# Patient Record
Sex: FEMALE | Race: WHITE | NOT HISPANIC OR LATINO
[De-identification: names, ages, dates, MRNs, and addresses within clinical notes are randomized per-mention and may not be internally consistent; named-entity substitution may affect disease eponyms.]

---

## 2017-08-07 ENCOUNTER — TRANSCRIPTION ENCOUNTER (OUTPATIENT)
Age: 62
End: 2017-08-07

## 2018-02-06 ENCOUNTER — TRANSCRIPTION ENCOUNTER (OUTPATIENT)
Age: 63
End: 2018-02-06

## 2018-10-29 ENCOUNTER — RECORD ABSTRACTING (OUTPATIENT)
Age: 63
End: 2018-10-29

## 2018-10-29 DIAGNOSIS — Z86.018 PERSONAL HISTORY OF OTHER BENIGN NEOPLASM: ICD-10-CM

## 2018-10-29 DIAGNOSIS — Z80.8 FAMILY HISTORY OF MALIGNANT NEOPLASM OF OTHER ORGANS OR SYSTEMS: ICD-10-CM

## 2018-10-29 DIAGNOSIS — Z87.42 PERSONAL HISTORY OF OTHER DISEASES OF THE FEMALE GENITAL TRACT: ICD-10-CM

## 2018-10-29 DIAGNOSIS — E66.3 OVERWEIGHT: ICD-10-CM

## 2018-10-29 DIAGNOSIS — R92.2 INCONCLUSIVE MAMMOGRAM: ICD-10-CM

## 2018-10-29 DIAGNOSIS — N95.2 POSTMENOPAUSAL ATROPHIC VAGINITIS: ICD-10-CM

## 2018-10-29 DIAGNOSIS — Z80.1 FAMILY HISTORY OF MALIGNANT NEOPLASM OF TRACHEA, BRONCHUS AND LUNG: ICD-10-CM

## 2018-10-29 DIAGNOSIS — Z80.41 FAMILY HISTORY OF MALIGNANT NEOPLASM OF OVARY: ICD-10-CM

## 2018-10-29 LAB — CYTOLOGY CVX/VAG DOC THIN PREP: NORMAL

## 2018-10-29 RX ORDER — ESCITALOPRAM OXALATE 10 MG/1
10 TABLET, FILM COATED ORAL DAILY
Refills: 0 | Status: ACTIVE | COMMUNITY

## 2018-11-19 ENCOUNTER — RESULT REVIEW (OUTPATIENT)
Age: 63
End: 2018-11-19

## 2018-11-30 ENCOUNTER — APPOINTMENT (OUTPATIENT)
Dept: OBGYN | Facility: CLINIC | Age: 63
End: 2018-11-30
Payer: COMMERCIAL

## 2018-11-30 VITALS
BODY MASS INDEX: 30.45 KG/M2 | WEIGHT: 194 LBS | HEIGHT: 67 IN | DIASTOLIC BLOOD PRESSURE: 80 MMHG | SYSTOLIC BLOOD PRESSURE: 120 MMHG

## 2018-11-30 DIAGNOSIS — Z00.00 ENCOUNTER FOR GENERAL ADULT MEDICAL EXAMINATION W/OUT ABNORMAL FINDINGS: ICD-10-CM

## 2018-11-30 DIAGNOSIS — Z87.39 PERSONAL HISTORY OF OTHER DISEASES OF THE MUSCULOSKELETAL SYSTEM AND CONNECTIVE TISSUE: ICD-10-CM

## 2018-11-30 PROCEDURE — 99396 PREV VISIT EST AGE 40-64: CPT

## 2018-12-05 LAB — CYTOLOGY CVX/VAG DOC THIN PREP: NORMAL

## 2018-12-12 ENCOUNTER — RESULT REVIEW (OUTPATIENT)
Age: 63
End: 2018-12-12

## 2018-12-17 PROBLEM — Z87.39 HISTORY OF LOW BACK PAIN: Status: RESOLVED | Noted: 2018-11-30 | Resolved: 2018-12-17

## 2019-11-20 ENCOUNTER — RESULT REVIEW (OUTPATIENT)
Age: 64
End: 2019-11-20

## 2019-11-27 ENCOUNTER — APPOINTMENT (OUTPATIENT)
Dept: OBGYN | Facility: CLINIC | Age: 64
End: 2019-11-27
Payer: COMMERCIAL

## 2019-11-27 ENCOUNTER — TRANSCRIPTION ENCOUNTER (OUTPATIENT)
Age: 64
End: 2019-11-27

## 2019-11-27 VITALS
BODY MASS INDEX: 29.35 KG/M2 | DIASTOLIC BLOOD PRESSURE: 80 MMHG | HEIGHT: 67 IN | SYSTOLIC BLOOD PRESSURE: 110 MMHG | WEIGHT: 187 LBS

## 2019-11-27 DIAGNOSIS — R31.9 URINARY TRACT INFECTION, SITE NOT SPECIFIED: ICD-10-CM

## 2019-11-27 DIAGNOSIS — N39.0 URINARY TRACT INFECTION, SITE NOT SPECIFIED: ICD-10-CM

## 2019-11-27 DIAGNOSIS — Z12.31 ENCOUNTER FOR SCREENING MAMMOGRAM FOR MALIGNANT NEOPLASM OF BREAST: ICD-10-CM

## 2019-11-27 DIAGNOSIS — N95.1 MENOPAUSAL AND FEMALE CLIMACTERIC STATES: ICD-10-CM

## 2019-11-27 PROCEDURE — 81015 MICROSCOPIC EXAM OF URINE: CPT

## 2019-11-27 PROCEDURE — 99396 PREV VISIT EST AGE 40-64: CPT

## 2019-11-27 NOTE — DISCUSSION/SUMMARY
[FreeTextEntry1] : 1. Annual: Benign GYN exam. Counseled new guidelines for cervical cancer screening is repeating pap every 5 yrs if negative co-testing or every 3 yrs if negative cytology. Pt reports anxiety given Fhx of breast and ovarian cancer, desires annual pap and pelvic US. Pap performed today and reassured that majority of cervical cancer due to persistent HPV infection. Pelvic US report reviewed with patient, as well as normal MMG report. Declined STD testing. Up to date on colonoscopy. RTC 1 year for Annual.\par \par 2. Vaginal dryness: Discussed non-hormonal options such as Replens. Rx sent.\par \par 3. Dysuria: moderate blood on UA. Offered tx based on UA and symptoms of UTI. Pt declined and would like to wait first for UCx results.

## 2019-11-27 NOTE — PHYSICAL EXAM
[Awake] : awake [Alert] : alert [Soft] : soft [Oriented x3] : oriented to person, place, and time [Normal] : uterus [Labia Majora] : labia major [Labia Minora] : labia minora [No Bleeding] : there was no active vaginal bleeding [Pap Obtained] : a Pap smear was performed [Uterine Adnexae] : were not tender and not enlarged [Acute Distress] : no acute distress [Mass] : no breast mass [Nipple Discharge] : no nipple discharge [Axillary LAD] : no axillary lymphadenopathy [Tender] : non tender [Discharge] : had no discharge [Motion Tenderness] : there was no cervical motion tenderness [Tenderness] : nontender [Enlarged ___ wks] : not enlarged [Adnexa Tenderness] : were not tender [Ovarian Mass (___ Cm)] : there were no adnexal masses

## 2019-12-04 DIAGNOSIS — R82.998 OTHER ABNORMAL FINDINGS IN URINE: ICD-10-CM

## 2019-12-04 LAB
APPEARANCE: CLEAR
BACTERIA UR CULT: NORMAL
BACTERIA: NEGATIVE
BILIRUBIN URINE: NEGATIVE
BLOOD URINE: ABNORMAL
CALCIUM OXALATE CRYSTALS: ABNORMAL
COLOR: YELLOW
CYTOLOGY CVX/VAG DOC THIN PREP: ABNORMAL
GLUCOSE QUALITATIVE U: NEGATIVE
HPV HIGH+LOW RISK DNA PNL CVX: NOT DETECTED
HYALINE CASTS: 0 /LPF
KETONES URINE: NEGATIVE
LEUKOCYTE ESTERASE URINE: NEGATIVE
MICROSCOPIC-UA: NORMAL
NITRITE URINE: NEGATIVE
PH URINE: 5
PROTEIN URINE: NEGATIVE
RED BLOOD CELLS URINE: 3 /HPF
SPECIFIC GRAVITY URINE: 1.03
SQUAMOUS EPITHELIAL CELLS: 1 /HPF
UROBILINOGEN URINE: NORMAL
WHITE BLOOD CELLS URINE: 1 /HPF

## 2019-12-06 ENCOUNTER — APPOINTMENT (OUTPATIENT)
Dept: OBGYN | Facility: CLINIC | Age: 64
End: 2019-12-06

## 2020-05-01 ENCOUNTER — TRANSCRIPTION ENCOUNTER (OUTPATIENT)
Age: 65
End: 2020-05-01

## 2020-11-04 ENCOUNTER — APPOINTMENT (OUTPATIENT)
Dept: ENDOCRINOLOGY | Facility: CLINIC | Age: 65
End: 2020-11-04
Payer: COMMERCIAL

## 2020-11-04 VITALS
OXYGEN SATURATION: 95 % | BODY MASS INDEX: 28.56 KG/M2 | DIASTOLIC BLOOD PRESSURE: 60 MMHG | HEART RATE: 70 BPM | SYSTOLIC BLOOD PRESSURE: 100 MMHG | WEIGHT: 182 LBS | HEIGHT: 67 IN

## 2020-11-04 PROCEDURE — 99204 OFFICE O/P NEW MOD 45 MIN: CPT

## 2020-11-04 PROCEDURE — 99072 ADDL SUPL MATRL&STAF TM PHE: CPT

## 2020-11-04 NOTE — DATA REVIEWED
[FreeTextEntry1] : TSH checked -yes it was borderline elevated normal free T4 elevated thyroid antibodies, low vitamin D A1c of 5.8%

## 2020-11-04 NOTE — HISTORY OF PRESENT ILLNESS
[FreeTextEntry1] : Ms. NUBIA ZAVALETA is 65 year female  who  presents with concerns of thyroid disorder \par dxed with Hashimoto thyroiditis by Dr. Lee and endocrinologist in [place] few yrs  back\par I have received records from him but they are very difficult to read.\par Her presenting complaints include\par when she bends her head she feels dizzy , \par some blood counts were low \par was on ASA daily stopped it, seeing a cardiologist as well \par has been losing hair approx for  a year. \par nails are brittle \par always been healthy , eats very healthy , harder to lose weight , \par symptoms are worse since Menopause. \par Biotin use-no \par h/o HLD , like EVOO, takes turmeric and tennille\par needs 8-9 hrs of sleep , feels more sleepy now  \par  Family h/o thyroid disorder-yes, sister on medications \par prior h/o thyroid surgery -no \par prior h/o GUTIÉRREZ treatment -no \par prior h/o thyroid medications -no \par Prior h/o radiation exposure to head or neck -no \par h/o exposure to nuclear accident -no \par family h/o thyroid cancer -no \par has anxiety - on Lexapro \par USG performed -no \par TSH checked -yes it was borderline elevated normal free T4 elevated thyroid antibodies, low vitamin D A1c of 5.8%\par \par

## 2020-11-30 ENCOUNTER — RESULT REVIEW (OUTPATIENT)
Age: 65
End: 2020-11-30

## 2020-12-02 ENCOUNTER — APPOINTMENT (OUTPATIENT)
Dept: OBGYN | Facility: CLINIC | Age: 65
End: 2020-12-02
Payer: COMMERCIAL

## 2020-12-02 VITALS
DIASTOLIC BLOOD PRESSURE: 70 MMHG | HEIGHT: 67 IN | WEIGHT: 181 LBS | BODY MASS INDEX: 28.41 KG/M2 | TEMPERATURE: 98 F | SYSTOLIC BLOOD PRESSURE: 122 MMHG

## 2020-12-02 PROCEDURE — 99396 PREV VISIT EST AGE 40-64: CPT

## 2020-12-02 PROCEDURE — 99072 ADDL SUPL MATRL&STAF TM PHE: CPT

## 2020-12-07 LAB
CYTOLOGY CVX/VAG DOC THIN PREP: ABNORMAL
HPV HIGH+LOW RISK DNA PNL CVX: NOT DETECTED

## 2020-12-11 ENCOUNTER — APPOINTMENT (OUTPATIENT)
Dept: ENDOCRINOLOGY | Facility: CLINIC | Age: 65
End: 2020-12-11

## 2020-12-21 PROBLEM — N39.0 URINARY TRACT INFECTION WITH HEMATURIA, SITE UNSPECIFIED: Status: RESOLVED | Noted: 2019-11-27 | Resolved: 2020-12-21

## 2021-07-22 ENCOUNTER — APPOINTMENT (OUTPATIENT)
Dept: PODIATRY | Facility: CLINIC | Age: 66
End: 2021-07-22
Payer: COMMERCIAL

## 2021-07-22 ENCOUNTER — NON-APPOINTMENT (OUTPATIENT)
Age: 66
End: 2021-07-22

## 2021-07-22 VITALS — HEIGHT: 67 IN | BODY MASS INDEX: 28.41 KG/M2 | WEIGHT: 181 LBS

## 2021-07-22 DIAGNOSIS — M20.11 HALLUX VALGUS (ACQUIRED), RIGHT FOOT: ICD-10-CM

## 2021-07-22 DIAGNOSIS — L85.1 ACQUIRED KERATOSIS [KERATODERMA] PALMARIS ET PLANTARIS: ICD-10-CM

## 2021-07-22 DIAGNOSIS — M20.12 HALLUX VALGUS (ACQUIRED), LEFT FOOT: ICD-10-CM

## 2021-07-22 DIAGNOSIS — M89.8X7 OTHER SPECIFIED DISORDERS OF BONE, ANKLE AND FOOT: ICD-10-CM

## 2021-07-22 PROCEDURE — 99203 OFFICE O/P NEW LOW 30 MIN: CPT

## 2021-07-22 PROCEDURE — 99072 ADDL SUPL MATRL&STAF TM PHE: CPT

## 2021-07-22 NOTE — PROCEDURE
[FreeTextEntry1] : had a lengthy discussion with the patient regarding the diagnosis etiology and differential diagnosis as well as treatment options for the presenting problem. Risks alternatives and benefits of treatment ranging from conservative to surgical explained in great detail. I also explained the progression of treatment from conservative to possible surgical treatment options as well as the benefits of each. I do stress conservative treatment if in fact conservative treatment is an option until it no longer provides relief. Over-the-counter products medications padding, and splinting were reviewed as well. All questions asked and answered appropriately to the patient's satisfaction\par \par During the evaluation and management I had a lengthy discussion with the patient regarding benefits of functional foot orthoses. I explained to the patient the etiology and treatment options and one of them included the offloading and balancing of the painful portion of the foot. I explained the importance of balancing in offloading the painful area as part of the overall treatment process to advance healing. I have asked the patient to consider this as part of the treatment\par A complete and thorough evaluation of the type of shoes they should be wearing and type of shoes for this time of year was discussed with patient.\par \par \par Utilizing a scalpel and sterile aseptic technique sharp debridement of multiple hyperkeratotic lesions performed. Appropriate padding were necessary.\par \par follow up prn\par \par \par

## 2021-07-22 NOTE — HISTORY OF PRESENT ILLNESS
[FreeTextEntry1] : Location: HAV/HK both \par Duration: many years, getting owrse\par Chronic:yes\par Past Tx: self\par Exacerbated by: shoes, boots\par \par

## 2021-07-22 NOTE — PHYSICAL EXAM
[General Appearance - In No Acute Distress] : in no acute distress [General Appearance - Alert] : alert [Sensation] : the sensory exam was normal to light touch and pinprick [No Focal Deficits] : no focal deficits [Deep Tendon Reflexes (DTR)] : deep tendon reflexes were 2+ and symmetric [Motor Exam] : the motor exam was normal [Oriented To Time, Place, And Person] : oriented to person, place, and time [Impaired Insight] : insight and judgment were intact [Affect] : the affect was normal [FreeTextEntry3] : Vascular exam reveals palpable pedal pulses, the foot is warm to touch, there was good capillary fill time, the skin is normal in appearance there is no evidence of vascular disease or compromise at this time [de-identified] : HAV bilateral, hypertrophy of PP of GT both, hammer toe 2nd right

## 2021-09-15 DIAGNOSIS — Z12.39 ENCOUNTER FOR OTHER SCREENING FOR MALIGNANT NEOPLASM OF BREAST: ICD-10-CM

## 2021-09-23 ENCOUNTER — TRANSCRIPTION ENCOUNTER (OUTPATIENT)
Age: 66
End: 2021-09-23

## 2021-09-23 DIAGNOSIS — Z86.018 PERSONAL HISTORY OF OTHER BENIGN NEOPLASM: ICD-10-CM

## 2021-10-13 ENCOUNTER — TRANSCRIPTION ENCOUNTER (OUTPATIENT)
Age: 66
End: 2021-10-13

## 2021-12-02 ENCOUNTER — RESULT REVIEW (OUTPATIENT)
Age: 66
End: 2021-12-02

## 2021-12-08 DIAGNOSIS — Z11.3 ENCOUNTER FOR SCREENING FOR INFECTIONS WITH A PREDOMINANTLY SEXUAL MODE OF TRANSMISSION: ICD-10-CM

## 2021-12-08 DIAGNOSIS — Z13.820 ENCOUNTER FOR SCREENING FOR OSTEOPOROSIS: ICD-10-CM

## 2022-01-05 ENCOUNTER — APPOINTMENT (OUTPATIENT)
Dept: OBGYN | Facility: CLINIC | Age: 67
End: 2022-01-05
Payer: COMMERCIAL

## 2022-01-05 VITALS
HEIGHT: 67 IN | DIASTOLIC BLOOD PRESSURE: 70 MMHG | SYSTOLIC BLOOD PRESSURE: 120 MMHG | WEIGHT: 180 LBS | BODY MASS INDEX: 28.25 KG/M2

## 2022-01-05 DIAGNOSIS — Z12.11 ENCOUNTER FOR SCREENING FOR MALIGNANT NEOPLASM OF COLON: ICD-10-CM

## 2022-01-05 DIAGNOSIS — Z13.820 ENCOUNTER FOR SCREENING FOR OSTEOPOROSIS: ICD-10-CM

## 2022-01-05 DIAGNOSIS — N70.11 CHRONIC SALPINGITIS: ICD-10-CM

## 2022-01-05 PROCEDURE — 99072 ADDL SUPL MATRL&STAF TM PHE: CPT

## 2022-01-05 PROCEDURE — 99396 PREV VISIT EST AGE 40-64: CPT

## 2022-01-06 LAB — HPV HIGH+LOW RISK DNA PNL CVX: NOT DETECTED

## 2022-01-10 LAB — CYTOLOGY CVX/VAG DOC THIN PREP: ABNORMAL

## 2022-03-21 ENCOUNTER — APPOINTMENT (OUTPATIENT)
Dept: ENDOCRINOLOGY | Facility: CLINIC | Age: 67
End: 2022-03-21
Payer: COMMERCIAL

## 2022-03-21 VITALS
BODY MASS INDEX: 27.94 KG/M2 | RESPIRATION RATE: 16 BRPM | HEIGHT: 67 IN | DIASTOLIC BLOOD PRESSURE: 70 MMHG | HEART RATE: 88 BPM | OXYGEN SATURATION: 98 % | WEIGHT: 178 LBS | SYSTOLIC BLOOD PRESSURE: 126 MMHG

## 2022-03-21 DIAGNOSIS — R13.10 DYSPHAGIA, UNSPECIFIED: ICD-10-CM

## 2022-03-21 DIAGNOSIS — E04.9 NONTOXIC GOITER, UNSPECIFIED: ICD-10-CM

## 2022-03-21 PROCEDURE — 99214 OFFICE O/P EST MOD 30 MIN: CPT

## 2022-03-21 PROCEDURE — 99072 ADDL SUPL MATRL&STAF TM PHE: CPT

## 2022-03-21 RX ORDER — IBUPROFEN 800 MG/1
800 TABLET, FILM COATED ORAL
Qty: 30 | Refills: 0 | Status: ACTIVE | COMMUNITY
Start: 2022-01-14

## 2022-03-21 RX ORDER — GLYCERIN/MIN OIL/POLYCARBOPHIL
GEL WITH APPLICATOR (GRAM) VAGINAL
Qty: 1 | Refills: 1 | Status: DISCONTINUED | COMMUNITY
Start: 2019-11-27 | End: 2022-03-21

## 2022-03-21 NOTE — HISTORY OF PRESENT ILLNESS
[FreeTextEntry1] : Ms. NUBIA ZAVALETA is 65 year female  who  presents with concerns of thyroid disorder \par dxed with Hashimoto thyroiditis by Dr. Lee and endocrinologist in [place] few yrs  back\par I have received records from him but they are very difficult to read.\par Her presenting complaints include\par when she bends her head she feels dizzy , \par some blood counts were low \par was on ASA daily stopped it, seeing a cardiologist as well \par has been losing hair approx for  a year. \par nails are brittle \par always been healthy , eats very healthy , harder to lose weight , \par symptoms are worse since Menopause. \par Biotin use-no \par h/o HLD , like EVOO, takes turmeric and tennille\par needs 8-9 hrs of sleep , feels more sleepy now  \par  Family h/o thyroid disorder-yes, sister on medications \par prior h/o thyroid surgery -no \par prior h/o GUTIÉRREZ treatment -no \par prior h/o thyroid medications -no \par Prior h/o radiation exposure to head or neck -no \par h/o exposure to nuclear accident -no \par family h/o thyroid cancer -no \par has anxiety - on Lexapro \par USG performed -no \par TSH checked -yes it was borderline elevated normal free T4 elevated thyroid antibodies, low vitamin D A1c of 5.8%\par \par \par 03/21/2022- FOLLOW UP\par She is doing well except for red spots on swallowing dysfunction and neck pressure symptoms.  Was started on low-dose antidepressant her mood is much better on that.  Looking for job opportunities slightly stressed about that as well.  Also has prediabetes.  No other new medical problems.  Has strong family history of multiple cancers which can be a source of anxiety.  On examination small goiter noted.  Will check thyroid function labs.  I will also screen her for prediabetes.  Would like to see her back in follow-up in 1 year from now.\par

## 2022-03-23 LAB
ESTIMATED AVERAGE GLUCOSE: 123 MG/DL
HBA1C MFR BLD HPLC: 5.9 %
T3FREE SERPL-MCNC: 2.74 PG/ML
T4 FREE SERPL-MCNC: 1.1 NG/DL
THYROGLOB AB SERPL-ACNC: <20 IU/ML
THYROPEROXIDASE AB SERPL IA-ACNC: 405 IU/ML
TSH SERPL-ACNC: 2.85 UIU/ML

## 2022-03-24 ENCOUNTER — TRANSCRIPTION ENCOUNTER (OUTPATIENT)
Age: 67
End: 2022-03-24

## 2022-04-05 ENCOUNTER — RESULT REVIEW (OUTPATIENT)
Age: 67
End: 2022-04-05

## 2022-04-20 ENCOUNTER — TRANSCRIPTION ENCOUNTER (OUTPATIENT)
Age: 67
End: 2022-04-20

## 2022-04-22 ENCOUNTER — TRANSCRIPTION ENCOUNTER (OUTPATIENT)
Age: 67
End: 2022-04-22

## 2022-05-16 ENCOUNTER — APPOINTMENT (OUTPATIENT)
Dept: PODIATRY | Facility: CLINIC | Age: 67
End: 2022-05-16
Payer: COMMERCIAL

## 2022-05-16 VITALS — HEIGHT: 67 IN | WEIGHT: 174.31 LBS | BODY MASS INDEX: 27.36 KG/M2

## 2022-05-16 DIAGNOSIS — M79.671 PAIN IN RIGHT FOOT: ICD-10-CM

## 2022-05-16 DIAGNOSIS — L60.0 INGROWING NAIL: ICD-10-CM

## 2022-05-16 PROCEDURE — 99213 OFFICE O/P EST LOW 20 MIN: CPT

## 2022-05-16 NOTE — REVIEW OF SYSTEMS
[As Noted in HPI] : as noted in HPI [Negative] : Musculoskeletal [Leg Claudication] : no intermittent leg claudication [Lower Ext Edema] : no lower extremity edema

## 2022-05-16 NOTE — PROCEDURE
[FreeTextEntry1] : I had a lengthy discussion with the patient regarding the way they should be cutting a nail in an effort to help prevent recurrence of this problem. I also revised self treatment should not be attempted and should there be any redness or pain on the side of the nail rather than treating it themselves they should call the office to make a follow up.\par Using sterile instrumentation a slant back procedure was done to the affected border. The area was then painted with a small amount of betadine and silvadene and a band aid. Discharge instructions were given to the patient and advised bacitracin daily for the next 3-5 days and if not completely better they should contact the office immediately.\par

## 2022-05-16 NOTE — PHYSICAL EXAM
[General Appearance - Alert] : alert [General Appearance - In No Acute Distress] : in no acute distress [FreeTextEntry3] : Vascular exam reveals palpable pedal pulses, the foot is warm to touch, there was good capillary fill time, the skin is normal in appearance there is no evidence of vascular disease or compromise at this time [de-identified] : overall muscle strength testing is normal, ROM to ankle, mid tarsal, MTP joints all WNL and w/out crepitus or jamming. No atrophy and overall weakness noted.\par  [FreeTextEntry1] : Evaluation of the area of chief complaint reveals a mild noninfected but reddened distal portion of the afffected nail. There is no discharge or drainage there is no sign of infectious process signs and symptoms are consistent with the noninfected ingrown nail\par

## 2022-05-16 NOTE — REASON FOR VISIT
[Follow-Up Visit] : a follow-up visit for [Ingrown Nail] : ingrown nail [FreeTextEntry2] : left foot

## 2022-06-15 ENCOUNTER — NON-APPOINTMENT (OUTPATIENT)
Age: 67
End: 2022-06-15

## 2022-06-15 ENCOUNTER — APPOINTMENT (OUTPATIENT)
Dept: NEUROLOGY | Facility: CLINIC | Age: 67
End: 2022-06-15
Payer: MEDICAID

## 2022-06-15 VITALS
HEART RATE: 83 BPM | SYSTOLIC BLOOD PRESSURE: 106 MMHG | BODY MASS INDEX: 27.31 KG/M2 | HEIGHT: 67 IN | WEIGHT: 174 LBS | DIASTOLIC BLOOD PRESSURE: 69 MMHG

## 2022-06-15 DIAGNOSIS — R29.90 UNSPECIFIED SYMPTOMS AND SIGNS INVOLVING THE NERVOUS SYSTEM: ICD-10-CM

## 2022-06-15 PROCEDURE — 99205 OFFICE O/P NEW HI 60 MIN: CPT

## 2022-06-17 NOTE — ASSESSMENT
[FreeTextEntry1] : Meagan Sal is a 66 year old woman with ongoing word finding difficulties, and a transient neurological event described as a sudden numbness in both arms and another episode where she was walking and veered to one side.\par \par MRI Brain to rule out any structural lesions. MRA brain and neck to rule out large artery atherosclerosis considering family history of ASCVD and her own LDL is so high. \par \par Snoring- Home sleep apnea test.\par \par Hyperlipidemia- educated patient and strongly encouraged possible alternatives to statins including Praluent. She has a follow up with her cardiologist scheduled. \par \par Follow up after above complete.

## 2022-06-17 NOTE — HISTORY OF PRESENT ILLNESS
[FreeTextEntry1] : Meagan Sal is a 66 year old woman with a history of familial hyperlipidemia presenting for a consultation. \par \par Medical history: anxiety after losing family, prediabetes, Hashimoto's, hereditary hyperlipidemia (Dr. Nabor Olivares cardiologist)\par Surgical history: 1997 ovarian cyst. \par Family history: breast, stomach, mother- Ovarian cancer\par aunt-pancreatic.\par Father- brain cancer. \par aunt- breast cancer.\par \par Reports word finding difficulties over two years has not been a slow decline. No issue paying bills or performing ADLs. No issues with short term memory. Family does not note any memory difficulties or repeating stories or questions. Snores at night and sleeps 8 hours per night wake up fatigued. Has not been tested for sleep apnea. \par Eats meat once per month.\par Lost weight 10 pounds in 2 months. \par Checked B12 with PCP 6/13\par \par Also reports left arm pain to shoulder and bicep area constant for three weeks described as achiness and does not disturb her sleep.  Also reports, both arms became numb for less than 10 minutes two weeks ago. No weakness. No pain. No neck pain or injury. Denies neck pain radiating down the arm. \par \par She had an episode of walking and veering to left three weeks ago lasted a few minutes. No weakness. No numbness. Has dizziness but not with the episode. No falls. \par \ron Has had dizziness when she bends forward and resolves shortly after she is upright. Described as lightheaded. Denies ringing in the ears. Denies headaches. Denies nausea or vomiting. \par \par Denies leg pain radiating down the leg.\par Does not skip meals. \par Walks but not daily. Active during day.\par \par Current medications:\par Lexapro 10mg\par B12 sublingual (stopped)\par \par No smoking or alcohol use.

## 2022-06-17 NOTE — PHYSICAL EXAM
[FreeTextEntry1] : Physical examination \par General: No acute distress, Awake, Alert.   \par  \par \par Mental status \par Awake, alert, and oriented to person, time and place, Normal attention span and concentration, Recent and remote memory intact, Language intact, Fund of knowledge intact.   \par 6/15/22 MoCA 27/30 memory 5/5\par \par Cranial Nerves \par II: VFF  \par III, IV, VI: PERRL, EOMI.   \par V: Facial sensation is normal B/L.   \par VII: Facial strength is normal B/L. \par \par \par VIII: Gross hearing is intact.    \par \par IX, X: Palate is midline and elevates symmetrically.   \par XI: Trapezius normal strength.   \par XII: Tongue midline without atrophy or fasciculations. \par \par Motor exam  \par Muscle tone - no evidence of rigidity or resistance in all 4 extremities.  \par No atrophy or fasciculations \par Muscle Strength: arms and legs, proximal and distal flexors and extensors are normal \par \par No UE drift.\par \par Reflexes \par  \par \par Diffuse hyporeflexia\par  \par \par Plantars right: mute.   \par Plantars left: mute.   \par  \par Coordination \par Finger to nose: Normal.  \par Heel to shin: Normal.    \par \par Sensory \par Intact sensation to vibration,PP,  and cold.\par  \par \par Gait \par Normal including heels, toes, and tandem gait.  \par  \par

## 2022-06-30 ENCOUNTER — RESULT REVIEW (OUTPATIENT)
Age: 67
End: 2022-06-30

## 2022-07-06 ENCOUNTER — APPOINTMENT (OUTPATIENT)
Dept: NEUROLOGY | Facility: CLINIC | Age: 67
End: 2022-07-06

## 2022-07-06 VITALS
BODY MASS INDEX: 27.31 KG/M2 | OXYGEN SATURATION: 98 % | DIASTOLIC BLOOD PRESSURE: 80 MMHG | HEIGHT: 67 IN | SYSTOLIC BLOOD PRESSURE: 120 MMHG | HEART RATE: 75 BPM | TEMPERATURE: 98.7 F | WEIGHT: 174 LBS

## 2022-07-06 PROCEDURE — 99214 OFFICE O/P EST MOD 30 MIN: CPT

## 2022-07-06 NOTE — HISTORY OF PRESENT ILLNESS
[FreeTextEntry1] : Meagan Sal is a 66 year old woman with a history of familial hyperlipidemia presenting for a follow up appointment. \par  \par She reports she is still discussing adding medication for cholesterol with her cardiologist. \par \par Still having ongoing word finding difficulties. She plans on scheduling the sleep study to evaluate for ROSSY.\par Has not had further episodes of arm numbness. \par \par Had an episode of dizziness since her last appointment. \par Denies headaches. \par \par The remaining neurological review of systems is negative.\par \par 6/15/22\par Meagan Sal is a 66 year old woman with a history of familial hyperlipidemia presenting for a consultation. \par \par Medical history: anxiety after losing family, prediabetes, Hashimoto's, hereditary hyperlipidemia (Dr. Nabor Olivares cardiologist)\par Surgical history: 1997 ovarian cyst. \par Family history: breast, stomach, mother- Ovarian cancer\par aunt-pancreatic.\par Father- brain cancer. \par aunt- breast cancer.\par \par Reports word finding difficulties over two years has not been a slow decline. No issue paying bills or performing ADLs. No issues with short term memory. Family does not note any memory difficulties or repeating stories or questions. Snores at night and sleeps 8 hours per night wake up fatigued. Has not been tested for sleep apnea. \ron Eats meat once per month.\par Lost weight 10 pounds in 2 months. \par Checked B12 with PCP 6/13\par \ron Also reports left arm pain to shoulder and bicep area constant for three weeks described as achiness and does not disturb her sleep.  Also reports, both arms became numb for less than 10 minutes two weeks ago. No weakness. No pain. No neck pain or injury. Denies neck pain radiating down the arm. \par \par She had an episode of walking and veering to left three weeks ago lasted a few minutes. No weakness. No numbness. Has dizziness but not with the episode. No falls. \par \ron Has had dizziness when she bends forward and resolves shortly after she is upright. Described as lightheaded. Denies ringing in the ears. Denies headaches. Denies nausea or vomiting. \par \par Denies leg pain radiating down the leg.\par Does not skip meals. \par Walks but not daily. Active during day.\par \par Current medications:\par Lexapro 10mg\par B12 sublingual (stopped)\par \par No smoking or alcohol use.

## 2022-07-06 NOTE — PHYSICAL EXAM
[FreeTextEntry1] : Physical examination \par General: No acute distress, Awake, Alert.   \par  \par \par Mental status \par Awake, alert, and oriented to person, time and place, Normal attention span and concentration, Recent and remote memory intact, Language intact, Fund of knowledge intact.   \par 6/15/22 MoCA 27/30 memory 5/5\par Delayed recall 3/3\par \par Cranial Nerves \par II: VFF  \par III, IV, VI: PERRL, EOMI.   \par V: Facial sensation is normal B/L.   \par VII: Facial strength is normal B/L. \par \par \par VIII: Gross hearing is intact.    \par \par IX, X: Palate is midline and elevates symmetrically.   \par XI: Trapezius normal strength.   \par XII: Tongue midline without atrophy or fasciculations. \par \par Motor exam  \par Muscle tone - no evidence of rigidity or resistance in all 4 extremities.  \par No atrophy or fasciculations \par Muscle Strength: arms and legs, proximal and distal flexors and extensors are normal \par \par No UE drift.\par \par Reflexes \par  \par \par Diffuse hyporeflexia\par  \par \par Plantars right: mute.   \par Plantars left: mute.   \par  \par Coordination \par Finger to nose: Normal.  \par Heel to shin: Normal.    \par \par Sensory \par Intact sensation to vibration,PP,  and cold.\par  \par \par Gait \par Normal including heels, toes, and tandem gait.  \par  \par

## 2022-07-06 NOTE — ASSESSMENT
[FreeTextEntry1] : Meagan Sal is a 66 year old woman with ongoing word finding difficulties, and a transient neurological event described as a sudden numbness in both arms and another episode where she was walking and veered to one side not likely a TIA.\par \par MRI and MRA reviewed with patient. 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 Discussed with Dr. Dinero. No need for follow up imaging. \par \par Snoring- Home sleep apnea test.\par \par Hyperlipidemia- educated patient and strongly encouraged possible alternatives to statins including Praluent. She has a follow up with her cardiologist scheduled. \par family history of ASCVD and her own LDL is so high. \par \par Follow up in 3-4 months. \par

## 2022-07-06 NOTE — DATA REVIEWED
[de-identified] :  7/5/22\par No acute intracranial hemorrhage, acute infarction, extra-axial fluid collection or hydrocephalus.  [de-identified] : 7/5/22 MRA head and neck\par IMPRESSION:\par \par  No hemodynamically significant stenosis or dissection identified within proximal intracranial and\par neck vasculature.\par \par  1 to 2 mm laterally and posteriorly oriented vascular outpouching arising from the proximal\par cavernous segment of the right internal carotid artery, which may represent small aneurysm,\par

## 2022-07-08 ENCOUNTER — APPOINTMENT (OUTPATIENT)
Dept: GASTROENTEROLOGY | Facility: CLINIC | Age: 67
End: 2022-07-08

## 2022-07-08 VITALS
BODY MASS INDEX: 27.31 KG/M2 | TEMPERATURE: 98.3 F | OXYGEN SATURATION: 96 % | DIASTOLIC BLOOD PRESSURE: 64 MMHG | HEART RATE: 71 BPM | HEIGHT: 67 IN | SYSTOLIC BLOOD PRESSURE: 116 MMHG | WEIGHT: 174 LBS

## 2022-07-08 DIAGNOSIS — Z86.010 PERSONAL HISTORY OF COLONIC POLYPS: ICD-10-CM

## 2022-07-08 PROCEDURE — 99204 OFFICE O/P NEW MOD 45 MIN: CPT

## 2022-07-08 RX ORDER — VIT A AND D3 IN COD LIVER OIL 1250-135
1000 CAPSULE ORAL
Refills: 0 | Status: DISCONTINUED | COMMUNITY
End: 2022-07-08

## 2022-07-08 RX ORDER — VALACYCLOVIR 1 G/1
1 TABLET, FILM COATED ORAL
Qty: 30 | Refills: 0 | Status: ACTIVE | COMMUNITY
Start: 2022-05-19

## 2022-07-08 RX ORDER — CHROMIUM 200 MCG
TABLET ORAL
Refills: 0 | Status: DISCONTINUED | COMMUNITY
End: 2022-07-08

## 2022-07-08 NOTE — ASSESSMENT
[FreeTextEntry1] : History of  colon polyp:  Colonoscopy scheduled\par \par Risks of the procedure including but not limited to bleeding / perforation / infection / anesthesia complication / missed polyp or lesion explained to the  patient . The patient expressed understanding and a desire to proceed with the procedure.\par \par Risk of not doing procedure includes but is not limited to missed or delayed diagnosis of gastrointestinal pathology.\par Pertinent available records reviewed\par

## 2022-07-08 NOTE — HISTORY OF PRESENT ILLNESS
[de-identified] : NUBIA ZAVALETA  is being evaluated at the request of Dr. Sutton for an opinion re: h/o colon polyp.  Denies  nausea, vomiting, fever, chills, diarrhea, constipation, melena, hematemesis, reflux, BRBPR\par \par \par -EGD / colonoscopy  ( 3/2017) for dysphagia  and colon cancer screening  was notable  for gastritis / esophagitis / hemorrhoids / diverticulosis / rectal polyp

## 2022-07-08 NOTE — CONSULT LETTER
[Dear  ___] : Dear  [unfilled], [Consult Letter:] : I had the pleasure of evaluating your patient, [unfilled]. [Please see my note below.] : Please see my note below. [Consult Closing:] : Thank you very much for allowing me to participate in the care of this patient.  If you have any questions, please do not hesitate to contact me. [Sincerely,] : Sincerely, [FreeTextEntry3] : Omid Macedo MD\par tel: [phone]\par fax: [phone]\par

## 2022-07-13 ENCOUNTER — APPOINTMENT (OUTPATIENT)
Dept: NEUROLOGY | Facility: CLINIC | Age: 67
End: 2022-07-13

## 2022-07-13 PROCEDURE — 95806 SLEEP STUDY UNATT&RESP EFFT: CPT

## 2022-07-15 ENCOUNTER — APPOINTMENT (OUTPATIENT)
Dept: NEUROLOGY | Facility: CLINIC | Age: 67
End: 2022-07-15

## 2022-07-17 ENCOUNTER — RESULT REVIEW (OUTPATIENT)
Age: 67
End: 2022-07-17

## 2022-07-18 ENCOUNTER — TRANSCRIPTION ENCOUNTER (OUTPATIENT)
Age: 67
End: 2022-07-18

## 2022-07-20 ENCOUNTER — TRANSCRIPTION ENCOUNTER (OUTPATIENT)
Age: 67
End: 2022-07-20

## 2022-07-20 ENCOUNTER — APPOINTMENT (OUTPATIENT)
Dept: GASTROENTEROLOGY | Facility: HOSPITAL | Age: 67
End: 2022-07-20

## 2022-07-20 DIAGNOSIS — E53.8 DEFICIENCY OF OTHER SPECIFIED B GROUP VITAMINS: ICD-10-CM

## 2022-07-25 ENCOUNTER — TRANSCRIPTION ENCOUNTER (OUTPATIENT)
Age: 67
End: 2022-07-25

## 2022-07-28 ENCOUNTER — APPOINTMENT (OUTPATIENT)
Dept: PULMONOLOGY | Facility: CLINIC | Age: 67
End: 2022-07-28

## 2022-07-28 ENCOUNTER — TRANSCRIPTION ENCOUNTER (OUTPATIENT)
Age: 67
End: 2022-07-28

## 2022-07-28 VITALS
HEIGHT: 67 IN | DIASTOLIC BLOOD PRESSURE: 70 MMHG | WEIGHT: 174 LBS | SYSTOLIC BLOOD PRESSURE: 110 MMHG | BODY MASS INDEX: 27.31 KG/M2 | TEMPERATURE: 98.7 F | OXYGEN SATURATION: 98 % | HEART RATE: 70 BPM

## 2022-07-28 PROCEDURE — 99204 OFFICE O/P NEW MOD 45 MIN: CPT

## 2022-07-28 NOTE — HISTORY OF PRESENT ILLNESS
[FreeTextEntry1] : Evaluation for sleep apnea. [de-identified] : This is a 66-year-old female who is complaining for the past 2 years of waking up tired and word difficulty finding the past 2 years. She sleeps well from 12 midnight to 8 AM. She sleeps through the night. She does snore loudly. She occasionally upon awakening feels like she is holding her breath. There is no witnessed apneas. There is no excessive daytime sleepiness during the day with an [place] score of zero. She has lost 12 pounds recently and is 5 feet 7 weight 174. She had a home sleep study on July 14 which reveals an AHI of 16.1 with a low O2 sat of 84%. She comes in now asking if she should be treated for sleep apnea. Past medical history hyperlipidemia, anxiety disorder. Medications include Lexapro. She is a nonsmoker and nondrinker.

## 2022-07-28 NOTE — ASSESSMENT
[FreeTextEntry1] : Patient with moderate sleep apnea on home study. I have explained to the patient that we generally do treat patients with moderate sleep apnea. I therefore will arrange for autopap. Patient will return to see me 2 months after starting CPAP therapy.

## 2022-07-28 NOTE — PHYSICAL EXAM
[No Acute Distress] : no acute distress [Well Nourished] : well nourished [Well Developed] : well developed [Well-Appearing] : well-appearing [Normal Sclera/Conjunctiva] : normal sclera/conjunctiva [PERRL] : pupils equal round and reactive to light [EOMI] : extraocular movements intact [Normal Outer Ear/Nose] : the outer ears and nose were normal in appearance [No JVD] : no jugular venous distention [No Lymphadenopathy] : no lymphadenopathy [Supple] : supple [Thyroid Normal, No Nodules] : the thyroid was normal and there were no nodules present [No Respiratory Distress] : no respiratory distress  [No Accessory Muscle Use] : no accessory muscle use [Clear to Auscultation] : lungs were clear to auscultation bilaterally [Normal Rate] : normal rate  [Regular Rhythm] : with a regular rhythm [Normal S1, S2] : normal S1 and S2 [No Murmur] : no murmur heard [No Carotid Bruits] : no carotid bruits [No Abdominal Bruit] : a ~M bruit was not heard ~T in the abdomen [No Varicosities] : no varicosities [Pedal Pulses Present] : the pedal pulses are present [No Edema] : there was no peripheral edema [No Palpable Aorta] : no palpable aorta [No Extremity Clubbing/Cyanosis] : no extremity clubbing/cyanosis [Soft] : abdomen soft [Non Tender] : non-tender [Non-distended] : non-distended [No Masses] : no abdominal mass palpated [No HSM] : no HSM [Normal Bowel Sounds] : normal bowel sounds [Normal Posterior Cervical Nodes] : no posterior cervical lymphadenopathy [Normal Anterior Cervical Nodes] : no anterior cervical lymphadenopathy [No Focal Deficits] : no focal deficits [Normal Gait] : normal gait [Normal Affect] : the affect was normal [Normal Insight/Judgement] : insight and judgment were intact [de-identified] : mallampati 2

## 2022-07-29 ENCOUNTER — TRANSCRIPTION ENCOUNTER (OUTPATIENT)
Age: 67
End: 2022-07-29

## 2022-08-01 ENCOUNTER — TRANSCRIPTION ENCOUNTER (OUTPATIENT)
Age: 67
End: 2022-08-01

## 2022-08-12 ENCOUNTER — APPOINTMENT (OUTPATIENT)
Dept: GASTROENTEROLOGY | Facility: CLINIC | Age: 67
End: 2022-08-12

## 2022-09-07 ENCOUNTER — NON-APPOINTMENT (OUTPATIENT)
Age: 67
End: 2022-09-07

## 2022-09-19 DIAGNOSIS — Z15.02 GENETIC SUSCEPTIBILITY TO MALIGNANT NEOPLASM OF OVARY: ICD-10-CM

## 2022-10-05 ENCOUNTER — TRANSCRIPTION ENCOUNTER (OUTPATIENT)
Age: 67
End: 2022-10-05

## 2022-10-11 ENCOUNTER — APPOINTMENT (OUTPATIENT)
Dept: PODIATRY | Facility: CLINIC | Age: 67
End: 2022-10-11

## 2022-10-12 ENCOUNTER — NON-APPOINTMENT (OUTPATIENT)
Age: 67
End: 2022-10-12

## 2022-10-18 ENCOUNTER — APPOINTMENT (OUTPATIENT)
Dept: PULMONOLOGY | Facility: CLINIC | Age: 67
End: 2022-10-18

## 2022-10-21 ENCOUNTER — APPOINTMENT (OUTPATIENT)
Dept: PODIATRY | Facility: CLINIC | Age: 67
End: 2022-10-21

## 2022-10-21 VITALS — BODY MASS INDEX: 27.31 KG/M2 | WEIGHT: 174 LBS | HEIGHT: 67 IN

## 2022-10-21 DIAGNOSIS — M76.61 ACHILLES TENDINITIS, RIGHT LEG: ICD-10-CM

## 2022-10-21 DIAGNOSIS — M67.471 GANGLION, RIGHT ANKLE AND FOOT: ICD-10-CM

## 2022-10-21 PROCEDURE — 73610 X-RAY EXAM OF ANKLE: CPT

## 2022-10-21 PROCEDURE — 73630 X-RAY EXAM OF FOOT: CPT

## 2022-10-21 RX ORDER — ERGOCALCIFEROL 1.25 MG/1
1.25 MG CAPSULE, LIQUID FILLED ORAL
Qty: 4 | Refills: 0 | Status: ACTIVE | COMMUNITY
Start: 2022-05-12

## 2022-10-21 NOTE — PROCEDURE
[FreeTextEntry1] : Based on my physical examination and my clinical findings and the patient's description of the symptoms, a complete differential diagnosis was reviewed with the patient. Possible diagnoses as well as treatment options explained in great detail. All questions asked and answered appropriately.\par After evaluating the patient and examining the area in question and reviewing the x-rays I feel at this time a magnetic resonance imaging is indicated and as a medical necessity and this note will serve as a letter medical necessity. Given the severity of the injury and the mechanism of injury I am concerned about osseous as well as significant soft tissue pathology, injury tear and possible rupture. I feel that an magnetic resonance imaging is the most appropriate diagnostic tests at this time and the patient should be granted authorization for an magnetic resonance imaging\par \par \par X-rays were taken in the office multiple views right foot \par I have reviewed the x-rays taken in the office with the patient, I have discussed my findings my recommendations etiology and treatment options based on x-ray evaluation and interpretation and patient understands, There is no evidence of fracture dislocation\par X-rays were taken in the office multiple views right ankle\par I have reviewed the x-rays taken in the office with the patient, I have discussed my findings my recommendations etiology and treatment options based on x-ray evaluation and interpretation and patient understands, There is no evidence of fracture dislocation\par

## 2022-10-21 NOTE — PHYSICAL EXAM
[FreeTextEntry3] : Vascular exam reveals palpable pedal pulses, the foot is warm to touch, there was good capillary fill time, the skin is normal in appearance there is no evidence of vascular disease or compromise at this time [de-identified] : overall muscle strength testing is normal, ROM to ankle, mid tarsal, MTP joints all WNL and w/out crepitus or jamming. No atrophy and overall weakness noted.\par  [FreeTextEntry1] : mass resembling ganglion/however not normally seen there

## 2022-10-21 NOTE — HISTORY OF PRESENT ILLNESS
[FreeTextEntry1] : Location- posterior aspect right ankle growing off the achilles\par Duration-6 months w/ exacerbations and remissions\par Past tx- seen by PCP iv[facility] derm\par

## 2022-11-28 ENCOUNTER — RESULT REVIEW (OUTPATIENT)
Age: 67
End: 2022-11-28

## 2022-12-07 ENCOUNTER — RESULT REVIEW (OUTPATIENT)
Age: 67
End: 2022-12-07

## 2022-12-09 ENCOUNTER — TRANSCRIPTION ENCOUNTER (OUTPATIENT)
Age: 67
End: 2022-12-09

## 2022-12-13 ENCOUNTER — TRANSCRIPTION ENCOUNTER (OUTPATIENT)
Age: 67
End: 2022-12-13

## 2022-12-14 ENCOUNTER — APPOINTMENT (OUTPATIENT)
Dept: PULMONOLOGY | Facility: CLINIC | Age: 67
End: 2022-12-14

## 2022-12-21 ENCOUNTER — APPOINTMENT (OUTPATIENT)
Dept: NEUROLOGY | Facility: CLINIC | Age: 67
End: 2022-12-21
Payer: MEDICARE

## 2022-12-21 VITALS
HEIGHT: 67 IN | DIASTOLIC BLOOD PRESSURE: 85 MMHG | BODY MASS INDEX: 26.37 KG/M2 | SYSTOLIC BLOOD PRESSURE: 136 MMHG | OXYGEN SATURATION: 99 % | WEIGHT: 168 LBS | HEART RATE: 65 BPM

## 2022-12-21 PROCEDURE — 99215 OFFICE O/P EST HI 40 MIN: CPT

## 2022-12-21 NOTE — HISTORY OF PRESENT ILLNESS
[FreeTextEntry1] : Meagan Sal is a 66 yo right - handed female here for follow-up,  Reports on-going word finding difficulty since 2018 slowly progressing, denies slurred speech, denies headaches.  Diagnosed with anxiety shortly after parents passed away 1 year apart  and currently on lexapro 10mg with no reported side effects.  She completed sleep study which showed mild sleep apnea, not tolerating CPAP at home and currently gets 7-8hr/nightly.  Denies waking up with headaches or dry mouth, however does wake-up feeling tired on a daily basis.  Independent with ADLs, continues to take care of paying bills with no issues and driving.  Previously had episode of feeling as if she was being "pulled" to the left hand side while walking, as of late has had episodes of veering to the right.  Denies fall, denies numbness / tingling. Exercises weekly with walking but not daily, continues to be active during the day.  Eating Mediterranean diet.  Will have lipid panel drawn at upcoming appointment with PCP in February.  \par \par The patient is very concerned about progressive speech difficulty. She describes it as word finding difficulty. She has noted this for over 2-3 years. Initially it would happen occasionally but now feels it happens several times per week. She feels it limits her ability to express herself. She also is not sure why she developed significant anxiety after her parents passed. She never had anxiety or depression issues her whole life. \par \par

## 2022-12-21 NOTE — DATA REVIEWED
[de-identified] :  7/5/22\par No acute intracranial hemorrhage, acute infarction, extra-axial fluid collection or hydrocephalus.  [de-identified] : 7/5/22 MRA head and neck\par IMPRESSION:\par \par  No hemodynamically significant stenosis or dissection identified within proximal intracranial and\par neck vasculature.\par \par  1 to 2 mm laterally and posteriorly oriented vascular outpouching arising from the proximal\par cavernous segment of the right internal carotid artery, which may represent small aneurysm,\par

## 2022-12-21 NOTE — PHYSICAL EXAM
[FreeTextEntry1] : Physical examination \par General: No acute distress, Awake, Alert.   \par  \par \par Mental status \par Awake, alert, and oriented to person, time and place, Normal attention span and concentration, Recent and remote memory intact, Language intact, Fund of knowledge intact.   \par 6/15/22 MoCA 27/30 - 12/21/22 MoCA 27/30  memory 5/5\par Delayed recall 3/3\par \par Cranial Nerves \par II: VFF  \par III, IV, VI: PERRL, EOMI.   \par V: Facial sensation is normal B/L.   \par VII: Facial strength is normal B/L. \par \par \par VIII: Gross hearing is intact.    \par \par IX, X: Palate is midline and elevates symmetrically.   \par XI: Trapezius normal strength.   \par XII: Tongue midline without atrophy or fasciculations. \par \par Motor exam  \par Muscle tone - no evidence of rigidity or resistance in all 4 extremities.  \par No atrophy or fasciculations \par Muscle Strength: arms and legs, proximal and distal flexors and extensors are normal \par \par No UE drift.\par \par Reflexes \par  \par \par Diffuse hyporeflexia\par  \par \par Plantars right: mute.   \par Plantars left: mute.   \par  \par Coordination \par Finger to nose: Normal.  \par Heel to shin: Normal.    \par \par Sensory \par Intact sensation to vibration,PP,  and cold.\par  \par \par Gait \par Normal including heels, toes, and tandem gait.  \par  \par

## 2023-01-18 ENCOUNTER — RESULT REVIEW (OUTPATIENT)
Age: 68
End: 2023-01-18

## 2023-01-18 DIAGNOSIS — R94.02 ABNORMAL BRAIN SCAN: ICD-10-CM

## 2023-01-23 ENCOUNTER — TRANSCRIPTION ENCOUNTER (OUTPATIENT)
Age: 68
End: 2023-01-23

## 2023-01-25 ENCOUNTER — APPOINTMENT (OUTPATIENT)
Dept: NEUROLOGY | Facility: CLINIC | Age: 68
End: 2023-01-25
Payer: MEDICARE

## 2023-01-25 VITALS
SYSTOLIC BLOOD PRESSURE: 114 MMHG | BODY MASS INDEX: 26.84 KG/M2 | WEIGHT: 167 LBS | HEART RATE: 73 BPM | HEIGHT: 66 IN | DIASTOLIC BLOOD PRESSURE: 70 MMHG

## 2023-01-25 DIAGNOSIS — H81.399 OTHER PERIPHERAL VERTIGO, UNSPECIFIED EAR: ICD-10-CM

## 2023-01-25 PROCEDURE — 99215 OFFICE O/P EST HI 40 MIN: CPT

## 2023-01-25 NOTE — ASSESSMENT
[FreeTextEntry1] : Meagan Sal is a 67 year old woman with ongoing word finding difficulties, slowly progressing since 2018.  Currently on lexapro 10mg for anxiety which was diagnosed shortly after losing both parents 1 year apart.   \par \par \par MRI and MRA reviewed with patient. 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 No need for follow up imaging. \par \par Snoring- Home sleep apnea test. showed mild sleep apnea, \par CPAP at home but does not tolerate well and refuses to use it \par \par  \par \par MRI brain shows no structural lesion to explain progressive word finding difficulty and new anxiety at an older age. Her current presentation is atypical for Alzheimers but I question whether this may be a FTD variant . PET scan results reviewed at length with patient which were abnormal but the pattern of hypometabolism suggestive neurodegenrative disorder. Will refer for further Neurocognitive testing  to better assess cogntive function to correlate with the PET Scan results \par \par Refer for Vestibular therapy as well \par

## 2023-01-25 NOTE — HISTORY OF PRESENT ILLNESS
[FreeTextEntry1] : Meagan Sal is a 66 yo right - handed female here for follow-up,  Reports on-going word finding difficulty since 2018 slowly progressing, denies slurred speech, denies headaches.  Diagnosed with anxiety shortly after parents passed away 1 year apart  and currently on lexapro 10mg with no reported side effects.  She completed sleep study which showed mild sleep apnea, not tolerating CPAP at home and currently gets 7-8hr/nightly.  Denies waking up with headaches or dry mouth, however does wake-up feeling tired on a daily basis.  Independent with ADLs, continues to take care of paying bills with no issues and driving.  .  \par \par The patient is very concerned about progressive speech difficulty. She describes it as word finding difficulty. She has noted this for over 2-3 years. Initially it would happen occasionally but now feels it happens several times per week. She feels it limits her ability to express herself. She also is not sure why she developed significant anxiety after her parents passed. She never had anxiety or depression issues her whole life. \par \par Now here to follow up on PET scan results \par \par Previously had episode of feeling as if she was being "pulled" to the left hand side while walking, as of late has had episodes of veering to the right.  Denies fall, denies numbness / tingling. Exercises weekly with walking but not daily, continues to be active during the day.  Eating Mediterranean diet.  \par \par

## 2023-01-25 NOTE — DATA REVIEWED
[de-identified] :  7/5/22\par No acute intracranial hemorrhage, acute infarction, extra-axial fluid collection or hydrocephalus.  [de-identified] : 7/5/22 MRA head and neck\par IMPRESSION:\par \par  No hemodynamically significant stenosis or dissection identified within proximal intracranial and\par neck vasculature.\par \par  1 to 2 mm laterally and posteriorly oriented vascular outpouching arising from the proximal\par cavernous segment of the right internal carotid artery, which may represent small aneurysm,\par \par \par 1/18/23 PET scan - see detailed report : abnormal hypometabolism pronounced sensorimotor cortex extending to prefrontal cortex, SMA, and superior parietal region with subtle asymmetry in left BG, thalamus. These findings are consistent with neurodegenerative disorder

## 2023-01-25 NOTE — REVIEW OF SYSTEMS
[Difficulty with Language] : ~M difficulty with language [Dizziness] : dizziness [Vertigo] : vertigo [Anxiety] : anxiety [Depression] : depression [Fever] : no fever [Chills] : no chills [Feeling Poorly] : not feeling poorly [Feeling Tired] : not feeling tired [Confused or Disoriented] : no confusion [Memory Lapses or Loss] : no memory loss [Decr. Concentrating Ability] : no decrease in concentrating ability [Changed Thought Patterns] : no change in thought patterns [Repeating Questions] : no repeated questioning about recent events [Facial Weakness] : no facial weakness [Arm Weakness] : no arm weakness [Hand Weakness] : no hand weakness [Leg Weakness] : no leg weakness [Poor Coordination] : good coordination [Difficulty Writing] : no difficulty writing [Difficulties in Speech] : no speech difficulties [Numbness] : no numbness [Tingling] : no tingling [Abnormal Sensation] : no abnormal sensation [Hypersensitivity] : no hypersensitivity [Fainting] : no fainting [Lightheadedness] : no lightheadedness [Cluster Headache] : no cluster headache [Migraine Headache] : no migraine headache [Tension Headache] : no tension-type headache [Difficulty Walking] : no difficulty walking [Inability to Walk] : able to walk [Ataxia] : no ataxia [Frequent Falls] : not falling [Limping] : not limping [Suicidal] : not suicidal [Change In Personality] : no personality change [Emotional Problems] : no emotional problems [Eyesight Problems] : no eyesight problems [Chest Pain] : no chest pain [Palpitations] : no palpitations [Shortness Of Breath] : no shortness of breath [Cough] : no cough [Muscle Weakness] : no muscle weakness

## 2023-01-31 ENCOUNTER — TRANSCRIPTION ENCOUNTER (OUTPATIENT)
Age: 68
End: 2023-01-31

## 2023-02-21 ENCOUNTER — FORM ENCOUNTER (OUTPATIENT)
Age: 68
End: 2023-02-21

## 2023-03-07 ENCOUNTER — APPOINTMENT (OUTPATIENT)
Dept: NEUROLOGY | Facility: CLINIC | Age: 68
End: 2023-03-07
Payer: MEDICARE

## 2023-03-07 VITALS
BODY MASS INDEX: 26.84 KG/M2 | WEIGHT: 167 LBS | DIASTOLIC BLOOD PRESSURE: 82 MMHG | SYSTOLIC BLOOD PRESSURE: 125 MMHG | HEIGHT: 66 IN | HEART RATE: 65 BPM

## 2023-03-07 PROCEDURE — 99213 OFFICE O/P EST LOW 20 MIN: CPT

## 2023-03-08 NOTE — DATA REVIEWED
[de-identified] :  7/5/22\par No acute intracranial hemorrhage, acute infarction, extra-axial fluid collection or hydrocephalus.  [de-identified] : 2/27/23: diagnostic impression- examination shows a generally normal neurocognitive profile with several low average scores that are mildly below expectation for this patient, affecting semantic memory access ( visual confrontation, naming category verbal fluency), verbal abstract thinking, and working memory.  The findings, however do not meet criteria for a diagnosis of mild neurocognitive disorder.   [de-identified] : 7/5/22 MRA head and neck\par IMPRESSION:\par \par  No hemodynamically significant stenosis or dissection identified within proximal intracranial and\par neck vasculature.\par \par  1 to 2 mm laterally and posteriorly oriented vascular outpouching arising from the proximal\par cavernous segment of the right internal carotid artery, which may represent small aneurysm,\par \par \par 1/18/23 PET scan - see detailed report : abnormal hypometabolism pronounced sensorimotor cortex extending to prefrontal cortex, SMA, and superior parietal region with subtle asymmetry in left BG, thalamus. These findings are consistent with neurodegenerative disorder

## 2023-03-08 NOTE — HISTORY OF PRESENT ILLNESS
[FreeTextEntry1] : Meagan Sal is a 68 yo right - handed female here for follow-up,  Reports on-going word finding difficulty since 2018 slowly progressing, denies slurred speech, denies headaches.  Diagnosed with anxiety shortly after parents passed away 1 year apart  and currently on lexapro 10mg with no reported side effects.  She completed sleep study which showed mild sleep apnea, not tolerating CPAP at home and currently gets 7-8hr/nightly.  Denies waking up with headaches or dry mouth, however does wake-up feeling tired on a daily basis.  Independent with ADLs, continues to take care of paying bills with no issues and driving.  .  \par \par Meagan continues to be very concerned about speech difficulty, reports has not seen progression since last visit.  Neuropsychological testing completed which showed no sign of neurocognitive disorder diagnosed with word finding difficulty.  Previous visit MD Dinero reviewed PET scan results with Meagan, she is very concerned regarding abnormalities stated on report.  Continues to be socially, physically, and mentally active.  Exercises weekly with walking but not daily, continues to be active during the day.  Eating Mediterranean diet.  \par \par

## 2023-03-08 NOTE — ASSESSMENT
[FreeTextEntry1] : Meagan Sal is a 67 year old woman with ongoing word finding difficulties, slowly progressing since 2018.  Currently on lexapro 10mg for anxiety which was diagnosed shortly after losing both parents 1 year apart.   \par \par Previous visit MD Dinero spoke to Meagan in regards to PET scan results which had abnormal findings, pattern of hypometabolism suggestive neurodegenerative disorder.  Neurocognitive testing complete which showed no evidence of neurocognitive disorder diagnosed with word finding difficulty.\par Explained in length to Meagan neuropsychological testing results and recommendation would be to monitor at this time. Encouraged to stay socially, physically, and mentally active. \par \par  elevated, Meagan does not want to start statins or any medications wants to try red yeast rice, educated Meagan risk of stroke with elevated level,  Meagan states PCP MD Sutton is following lab work \par \par Mild sleep apnea shown in home sleep test, Meagan refuses to wear CPAP machine can not tolerate well\par \par \par \par

## 2023-03-14 ENCOUNTER — APPOINTMENT (OUTPATIENT)
Dept: NEUROLOGY | Facility: CLINIC | Age: 68
End: 2023-03-14
Payer: MEDICARE

## 2023-03-14 PROCEDURE — 99447 NTRPROF PH1/NTRNET/EHR 11-20: CPT

## 2023-03-14 NOTE — REASON FOR VISIT
[Home] : at home, [unfilled] , at the time of the visit. [Medical Office: ([facility])___] : at the medical office located in  [Patient] : the patient [This encounter was initiated by telehealth (audio with video) and converted to telephone (audio only) due to technical difficulties.] : This encounter was initiated by telehealth (audio with video) and converted to telephone (audio only) due to technical difficulties.

## 2023-03-15 ENCOUNTER — APPOINTMENT (OUTPATIENT)
Dept: OBGYN | Facility: CLINIC | Age: 68
End: 2023-03-15
Payer: MEDICARE

## 2023-03-15 VITALS
HEIGHT: 66 IN | SYSTOLIC BLOOD PRESSURE: 102 MMHG | BODY MASS INDEX: 27 KG/M2 | WEIGHT: 168 LBS | DIASTOLIC BLOOD PRESSURE: 62 MMHG

## 2023-03-15 DIAGNOSIS — Z15.02 GENETIC SUSCEPTIBILITY TO MALIGNANT NEOPLASM OF OVARY: ICD-10-CM

## 2023-03-15 PROCEDURE — G0101: CPT

## 2023-03-15 NOTE — HISTORY OF PRESENT ILLNESS
[TextBox_4] : 66yo P0 here for gyn exam.  Previous pt of LC.. Is scheduled for hip replacement later this month. \par  x years without any abnl bleeding.\par Pt has a strong FH of cancer- mother with ovarian-  at age 68yo; m. cousin with ovarian CA and m aunt with pancreatic CA at age 54yo.\par Had a stable pelvic sono 2022 at [place]. Last breast imaging 2022.\par \par Pt is currently retired; she was laid off from "[employer] during Covid and now is retired.  She lives alone in a residential building in [place] and she likes it there; has neighbors who take care of each other.  She is currently in a relationship.

## 2023-03-20 ENCOUNTER — APPOINTMENT (OUTPATIENT)
Dept: OBGYN | Facility: CLINIC | Age: 68
End: 2023-03-20

## 2023-03-21 LAB
CYTOLOGY CVX/VAG DOC THIN PREP: ABNORMAL
HPV HIGH+LOW RISK DNA PNL CVX: NOT DETECTED

## 2023-03-29 ENCOUNTER — RX RENEWAL (OUTPATIENT)
Age: 68
End: 2023-03-29

## 2023-04-05 ENCOUNTER — NON-APPOINTMENT (OUTPATIENT)
Age: 68
End: 2023-04-05

## 2023-04-26 ENCOUNTER — APPOINTMENT (OUTPATIENT)
Dept: NEUROLOGY | Facility: CLINIC | Age: 68
End: 2023-04-26

## 2023-05-18 ENCOUNTER — APPOINTMENT (OUTPATIENT)
Dept: PODIATRY | Facility: CLINIC | Age: 68
End: 2023-05-18
Payer: MEDICARE

## 2023-05-18 VITALS — BODY MASS INDEX: 27 KG/M2 | WEIGHT: 168 LBS | HEIGHT: 66 IN

## 2023-05-18 DIAGNOSIS — L60.2 ONYCHOGRYPHOSIS: ICD-10-CM

## 2023-05-18 DIAGNOSIS — L60.3 NAIL DYSTROPHY: ICD-10-CM

## 2023-05-18 DIAGNOSIS — L60.1 ONYCHOLYSIS: ICD-10-CM

## 2023-05-18 PROCEDURE — 99213 OFFICE O/P EST LOW 20 MIN: CPT

## 2023-05-18 NOTE — PHYSICAL EXAM
[FreeTextEntry3] : Vascular exam reveals palpable pedal pulses, the foot is warm to touch, there was good capillary fill time, the skin is normal in appearance there is no evidence of vascular disease or compromise at this time [No Joint Swelling] : no joint swelling [Normal Foot/Ankle] : Both lower extremities were exposed and visualized. Standing exam demonstrates normal foot posture and alignment. Hindfoot exam shows no hindfoot valgus or varus [de-identified] : overall muscle strength testing is normal, ROM to ankle, mid tarsal, MTP joints all WNL and w/out crepitus or jamming. No atrophy and overall weakness noted.\par  [Skin Color & Pigmentation] : normal skin color and pigmentation [Skin Turgor] : normal skin turgor [] : no rash [Skin Lesions] : no skin lesions [Foot Ulcer] : no foot ulcer [Skin Induration] : no skin induration [FreeTextEntry1] : Mild lysis distally of the left great toenail.  The nail was cut significantly short there is no nail to culture or biopsy

## 2023-05-18 NOTE — PROCEDURE
[FreeTextEntry1] : Based on my physical examination and my clinical findings and the patient's description of the symptoms, a complete differential diagnosis was reviewed with the patient. Possible diagnoses as well as treatment options explained in great detail. All questions asked and answered appropriately.\par A lengthy and inform a discussion with the patient regarding different types of treatments for the mycotic nail disease. I stressed clinical versus mycologic cure rates and anticipated success rates regarding topical medications oral medications as well as laser therapy. I discussed in great length with the patient the success rates and success rates anticipated. There were no guarantees given regarding any of the treatment reviewed. I did explain to the patient that there are risks to oral antifungal therapy however those risks can be greatly decreased with obtaining blood tests prior and possibly during the treatment if indicated. The patient will weigh their options and contact the office  when the nail is long enough to perform the appropriate testing to give her odds of success or lack there of\par

## 2023-07-11 ENCOUNTER — APPOINTMENT (OUTPATIENT)
Dept: ENDOCRINOLOGY | Facility: CLINIC | Age: 68
End: 2023-07-11
Payer: MEDICARE

## 2023-07-11 VITALS
HEIGHT: 66 IN | BODY MASS INDEX: 27.32 KG/M2 | WEIGHT: 170 LBS | SYSTOLIC BLOOD PRESSURE: 112 MMHG | OXYGEN SATURATION: 99 % | DIASTOLIC BLOOD PRESSURE: 64 MMHG | HEART RATE: 70 BPM

## 2023-07-11 DIAGNOSIS — R73.03 PREDIABETES.: ICD-10-CM

## 2023-07-11 PROCEDURE — 99214 OFFICE O/P EST MOD 30 MIN: CPT

## 2023-07-12 LAB
ESTIMATED AVERAGE GLUCOSE: 123 MG/DL
HBA1C MFR BLD HPLC: 5.9 %
T4 FREE SERPL-MCNC: 1 NG/DL
TSH SERPL-ACNC: 3.57 UIU/ML

## 2023-08-30 ENCOUNTER — NON-APPOINTMENT (OUTPATIENT)
Age: 68
End: 2023-08-30

## 2023-12-26 ENCOUNTER — APPOINTMENT (OUTPATIENT)
Dept: NEUROLOGY | Facility: CLINIC | Age: 68
End: 2023-12-26
Payer: MEDICARE

## 2023-12-26 VITALS — SYSTOLIC BLOOD PRESSURE: 124 MMHG | HEART RATE: 69 BPM | DIASTOLIC BLOOD PRESSURE: 82 MMHG

## 2023-12-26 DIAGNOSIS — F41.9 ANXIETY DISORDER, UNSPECIFIED: ICD-10-CM

## 2023-12-26 PROCEDURE — 99215 OFFICE O/P EST HI 40 MIN: CPT

## 2023-12-26 NOTE — REVIEW OF SYSTEMS
[Seizures] : no convulsions [Dizziness] : no dizziness [Fainting] : no fainting [Lightheadedness] : no lightheadedness [Vertigo] : no vertigo [Negative] : Heme/Lymph

## 2023-12-26 NOTE — PHYSICAL EXAM
[General Appearance - Alert] : alert [General Appearance - In No Acute Distress] : in no acute distress [Oriented To Time, Place, And Person] : oriented to person, place, and time [Impaired Insight] : insight and judgment were intact [Affect] : the affect was normal [Person] : oriented to person [Place] : oriented to place [Time] : oriented to time [Short Term Intact] : short term memory intact [Remote Intact] : remote memory intact [Registration Intact] : recent registration memory intact [Span Intact] : the attention span was normal [Concentration Intact] : normal concentrating ability [Visual Intact] : visual attention was ~T not ~L decreased [Naming Objects] : no difficulty naming common objects [Repeating Phrases] : no difficulty repeating a phrase [Writing A Sentence] : no difficulty writing a sentence [Fluency] : fluency intact [Comprehension] : comprehension intact [Reading] : reading intact [Current Events] : adequate knowledge of current events [Past History] : adequate knowledge of personal past history [Vocabulary] : adequate range of vocabulary [Total Score ___ / 30] : the patient achieved a score of [unfilled] /30 [Date / Time ___ / 5] : date / time [unfilled] / 5 [Place ___ / 5] : place [unfilled] / 5 [Registration ___ / 3] : registration [unfilled] / 3 [Serial Sevens ___/5] : serial sevens [unfilled] / 5 [Naming 2 Objects ___ / 2] : naming two objects [unfilled] / 2 [Repeating a Sentence ___ / 1] : repeating a sentence [unfilled] / 1 [Writing a Sentence ___ / 1] : write sentence [unfilled] / 1 [3-stage Verbal Command ___ / 3] : three-stage verbal command [unfilled] / 3 [Written Command ___ / 1] : written command [unfilled] / 1 [Copy a Design ___ / 1] : copy a design [unfilled] / 1 [Recall ___ / 3] : recall [unfilled] / 3 [I: Normal Smell] : smell intact bilaterally [Cranial Nerves Optic (II)] : visual acuity intact bilaterally,  visual fields full to confrontation, pupils equal round and reactive to light [Cranial Nerves Oculomotor (III)] : extraocular motion intact [Cranial Nerves Trigeminal (V)] : facial sensation intact symmetrically [Cranial Nerves Facial (VII)] : face symmetrical [Cranial Nerves Vestibulocochlear (VIII)] : hearing was intact bilaterally [Cranial Nerves Glossopharyngeal (IX)] : tongue and palate midline [Cranial Nerves Accessory (XI - Cranial And Spinal)] : head turning and shoulder shrug symmetric [Cranial Nerves Hypoglossal (XII)] : there was no tongue deviation with protrusion [Motor Tone] : muscle tone was normal in all four extremities [Motor Strength] : muscle strength was normal in all four extremities [Involuntary Movements] : no involuntary movements were seen [No Muscle Atrophy] : normal bulk in all four extremities [Motor Handedness Right-Handed] : the patient is right hand dominant [Motor Strength Upper Extremities Bilaterally] : strength was normal in both upper extremities [Motor Strength Lower Extremities Bilaterally] : strength was normal in both lower extremities [Sensation Tactile Decrease] : light touch was intact [Romberg's Sign] : a positive Romberg's sign was present [Abnormal Walk] : normal gait [Balance] : balance was intact [Limited Balance] : the patient's balance was impaired [Past-pointing] : there was no past-pointing [Tremor] : no tremor present [2+] : Ankle jerk left 2+ [Plantar Reflex Right Only] : normal on the right [Plantar Reflex Left Only] : normal on the left [FreeTextEntry5] : mildly + PM bilat. [FreeTextEntry4] : Mental Status Exam Presidents: Named Scott and Melquiades. Unable to recall previous ones, but can describe what family member's do.  Alternating Pattern: ok Spiral: ok Clock: ok Repetition: ok  Trail A: 51" B: 140 " Fluency: A: 6 words Animals: 17 Go-No-Go: intact  R/L discrimination on self and examiner: ok Cross-line commands: intact  Praxis: ok -Motor: ok -Dynamic/Luria: ok -Ideomotor/Imitation: ok -Ideational/writing/closing-in: ok -Dressing: ok. [FreeTextEntry6] : No fasciculation noted nor evoked.  [Sclera] : the sclera and conjunctiva were normal [PERRL With Normal Accommodation] : pupils were equal in size, round, reactive to light, with normal accommodation [Extraocular Movements] : extraocular movements were intact [No APD] : no afferent pupillary defect [No GOYO] : no internuclear ophthalmoplegia [Full Visual Field] : full visual field [Outer Ear] : the ears and nose were normal in appearance [Neck Appearance] : the appearance of the neck was normal [Edema] : there was no peripheral edema [] : no rash

## 2023-12-26 NOTE — ASSESSMENT
[FreeTextEntry1] : Assessment: 67yo RH WW with above PMH. No clinical evidence of cognitive defects, while NPT was almost WNL. No motor changes.  FDG with ? CBS features, (or PLS?) but no clinical evidence. FDOPA may be revealing, but BG signal is so minimal that I doubt. Will d/w Dr. LANDERS. Denies anxiety and depression but has some anticipation and situational anxiety. Hashimoto's with TPO 400s, probalby irrelevant.  Diagnostic Impression: -STM loss  Plan: -review FDG-PET with Dr. LANDERS and decide on FDOPA -consider completing Rheum panel and Cytokine panel - AIE? I recommended to pursue mental and physical activity and to adhere to Mediterranean type of diet. tenderness/left paraspinal

## 2023-12-26 NOTE — HISTORY OF PRESENT ILLNESS
[FreeTextEntry1] : COVID, mild. COVID VACCINE FULL.  HPI: 68-year-old right-handed female presents today with poor recall of names and places. Walking to a room and feels pulled to the right or pulled to left and makes her anxious. Hashimoto's thyroiditis () not treated.  BROTHER with ALS. FHx for AD and Parkinsonism, and dystonia.  PMH: Anxiety, Herpes   -Memory: poor recall, STM intact.  -Speech: intact  -Orientation: intact  -Praxis: ok -Decision making/Executive fx/Multitasking: intact  -Sleep: good habits   -Appetite: good appetite   -Motor symptoms: none  -B/B: wears pads for bladder incontinence   -Psychiatric symptoms: none   -Functional status: [place] Index of [place] in Activities of Daily Livin 1. Bathing/Showerin 2. Dressin 3. Toiletin 4. Transferrin 5. Continence: 1 6: Feedin  TOTAL: 7  [place] Instrumental Activities of Daily Living: A. Ability to Use Telephone: 1 B. Shoppin C. Food Preparation: 1 D. Housekeepin E. Laundry: 1 F. Transportation: 1 G. Responsibility for Own Medications: 1 H: Ability to Handle Finances: 1  TOTAL: 8  CDR: 0.5  -Professional status:  in healthcare   PCP and other physicians: -PCP: Dr. Carlos Sutton [phone]  Workup done: NPT 2023: semantic memory, abstract thinking, working memory with low average scores, but all WNL. Not suggestive of any phenotype associated with CBS. FDG-PET: ? CBS? PLS features?

## 2023-12-26 NOTE — DATA REVIEWED
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  Correlation with clinical exam and neuropsychological assessment is advised.

## 2024-01-05 DIAGNOSIS — R47.89 OTHER SPEECH DISTURBANCES: ICD-10-CM

## 2024-01-05 DIAGNOSIS — E06.3 AUTOIMMUNE THYROIDITIS: ICD-10-CM

## 2024-01-05 DIAGNOSIS — Z15.09 GENETIC SUSCEPTIBILITY TO OTHER MALIGNANT NEOPLASM: ICD-10-CM

## 2024-01-12 ENCOUNTER — LABORATORY RESULT (OUTPATIENT)
Age: 69
End: 2024-01-12

## 2024-01-16 LAB
A-TUMOR NECROSIS FACT SERPL-MCNC: <1.7 PG/ML
ACE BLD-CCNC: 42 U/L
ANA SER IF-ACNC: NEGATIVE
CRP SERPL-MCNC: <3 MG/L
ENA RNP AB SER IA-ACNC: <0.2 AL
ENA SM AB SER IA-ACNC: <0.2 AL
ENA SS-A AB SER IA-ACNC: <0.2 AL
ENA SS-B AB SER IA-ACNC: <0.2 AL
FOLATE SERPL-MCNC: 15.7 NG/ML
HCYS SERPL-MCNC: 9.3 UMOL/L
IGNF SERPL-MCNC: <4.2 PG/ML
IL10 SERPL-MCNC: <2.8 PG/ML
IL12 SERPL-MCNC: <1.9 PG/ML
IL13 SERPL-MCNC: <1.7 PG/ML
IL17A SERPL-MCNC: <1.4 PG/ML
IL2 SERPL-MCNC: 423.4 PG/ML
IL2 SERPL-MCNC: <2.1 PG/ML
IL4 SERPL-MCNC: <2.2 PG/ML
IL6 SERPL-MCNC: <2 PG/ML
IL8 SERPL-MCNC: <3 PG/ML
INTERLEUKIN 1 BETA: <6.5 PG/ML
INTERLEUKIN 5: <2.1 PG/ML
THYROGLOB AB SERPL-ACNC: <20 IU/ML
THYROPEROXIDASE AB SERPL IA-ACNC: 372 IU/ML
VIT B12 SERPL-MCNC: 1362 PG/ML

## 2024-01-17 LAB
CRYOGLOB SERPL-MCNC: NEGATIVE
METHYLMALONATE SERPL-SCNC: 133 NMOL/L
PANTOTHENATE SERPLBLD-MCNC: 52 NG/ML
VIT B1 SERPL-MCNC: 93.2 NMOL/L

## 2024-01-19 LAB — VIT B6 SERPL-MCNC: 7.3 UG/L

## 2024-01-22 LAB
AMPA-R ABCBA: NEGATIVE
AMPHIPHYSIN IGG TITR SER IF: NEGATIVE
ANNOTATION COMMENT IMP: NORMAL
CASPR2-IGG CBA, S: NEGATIVE
CV2 IGG TITR SER: NEGATIVE
GABA-B ABCBA: NEGATIVE
GAD65 AB SER-MCNC: 0 NMOL/L
GLIAL NUC TYPE 1 AB TITR SER: NEGATIVE
HU1 AB TITR SER: NEGATIVE
HU2 AB TITR SER IF: NEGATIVE
HU3 AB TITR SER: NEGATIVE
IGLON5 IFA, S: NEGATIVE
IMMUNOLOGIST REVIEW: NORMAL
LGI1-IGG CBA, S: NEGATIVE
NIF IFA, S: NEGATIVE
NMDA-R ABCBA: NEGATIVE
PCA-1 AB TITR SER: NEGATIVE
PCA-2 AB TITR SER: NEGATIVE
PCA-TR AB TITR SER: NEGATIVE

## 2024-01-29 LAB — VIT B2 SERPL-MCNC: 242 UG/L

## 2024-02-01 ENCOUNTER — RESULT REVIEW (OUTPATIENT)
Age: 69
End: 2024-02-01

## 2024-03-01 ENCOUNTER — TRANSCRIPTION ENCOUNTER (OUTPATIENT)
Age: 69
End: 2024-03-01

## 2024-03-05 DIAGNOSIS — N83.209 UNSPECIFIED OVARIAN CYST, UNSPECIFIED SIDE: ICD-10-CM

## 2024-03-13 ENCOUNTER — RESULT REVIEW (OUTPATIENT)
Age: 69
End: 2024-03-13

## 2024-03-20 ENCOUNTER — APPOINTMENT (OUTPATIENT)
Dept: OBGYN | Facility: CLINIC | Age: 69
End: 2024-03-20
Payer: MEDICARE

## 2024-03-20 VITALS
WEIGHT: 169 LBS | BODY MASS INDEX: 27.16 KG/M2 | SYSTOLIC BLOOD PRESSURE: 120 MMHG | HEIGHT: 66 IN | DIASTOLIC BLOOD PRESSURE: 70 MMHG

## 2024-03-20 VITALS
DIASTOLIC BLOOD PRESSURE: 60 MMHG | BODY MASS INDEX: 27.32 KG/M2 | WEIGHT: 170 LBS | HEIGHT: 66 IN | SYSTOLIC BLOOD PRESSURE: 102 MMHG

## 2024-03-20 DIAGNOSIS — Z01.419 ENCOUNTER FOR GYNECOLOGICAL EXAMINATION (GENERAL) (ROUTINE) W/OUT ABNORMAL FINDINGS: ICD-10-CM

## 2024-03-20 DIAGNOSIS — R39.9 UNSPECIFIED SYMPTOMS AND SIGNS INVOLVING THE GENITOURINARY SYSTEM: ICD-10-CM

## 2024-03-20 PROCEDURE — 81003 URINALYSIS AUTO W/O SCOPE: CPT | Mod: QW

## 2024-03-20 PROCEDURE — G0101: CPT

## 2024-03-20 NOTE — PHYSICAL EXAM
[Chaperone Declined] : Patient declined chaperone [Appropriately responsive] : appropriately responsive [Alert] : alert [No Acute Distress] : no acute distress [No Lymphadenopathy] : no lymphadenopathy [Non-tender] : non-tender [Soft] : soft [No HSM] : No HSM [Non-distended] : non-distended [No Mass] : no mass [No Lesions] : no lesions [Oriented x3] : oriented x3 [Examination Of The Breasts] : a normal appearance [No Masses] : no breast masses were palpable [Labia Majora] : normal [Labia Minora] : normal [Normal] : normal [Uterine Adnexae] : normal

## 2024-03-20 NOTE — PLAN
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  Rec she have tumor markers for screening given her extensive family history of CA.  F/u annually or PRN

## 2024-03-20 NOTE — HISTORY OF PRESENT ILLNESS
[TextBox_4] : 69yo P0 here for gyn exam.  Previous pt of LC. Recently underwent Abd/ Pelvic MRI.  She has reviewed her results and is asking about the trace FF.  She has a known h/o small cysts and a hydrosalpinx.  x years without any abnl bleeding. Pt has a strong FH of cancer- mother with ovarian-  at age 68yo; m. cousin with ovarian CA and m aunt with pancreatic CA at age 54yo. Had a stable pelvic sono 2022 at [place] [facility]. Last breast imaging 2022.  Pt is currently retired; she was laid off from [employer] during Covid and now semi-retired.  [place] who take [place] of each other.  She is currently in a relationship.

## 2024-04-08 LAB
APPEARANCE: CLEAR
BACTERIA UR CULT: NORMAL
BACTERIA: NEGATIVE /HPF
BILIRUB UR QL STRIP: NORMAL
BILIRUBIN URINE: NEGATIVE
BLOOD URINE: NEGATIVE
CANCER AG125 SERPL-ACNC: 14 U/ML
CANCER AG19-9 SERPL-ACNC: <2 U/ML
CAST: 0 /LPF
CEA SERPL-MCNC: 2.1 NG/ML
CLARITY UR: CLEAR
COLLECTION METHOD: NORMAL
COLOR: YELLOW
CYTOLOGY CVX/VAG DOC THIN PREP: ABNORMAL
EPITHELIAL CELLS: 0 /HPF
GLUCOSE QUALITATIVE U: NEGATIVE MG/DL
GLUCOSE UR-MCNC: NORMAL
HCG UR QL: 0.2 EU/DL
HE4X: 40.4 PMOL/L
HGB UR QL STRIP.AUTO: NORMAL
HPV HIGH+LOW RISK DNA PNL CVX: NOT DETECTED
KETONES UR-MCNC: NORMAL
KETONES URINE: NEGATIVE MG/DL
LEUKOCYTE ESTERASE UR QL STRIP: NORMAL
LEUKOCYTE ESTERASE URINE: NEGATIVE
MICROSCOPIC-UA: NORMAL
NITRITE UR QL STRIP: NORMAL
NITRITE URINE: NEGATIVE
PH UR STRIP: 7
PH URINE: 6.5
PROT UR STRIP-MCNC: NORMAL
PROTEIN URINE: NEGATIVE MG/DL
RED BLOOD CELLS URINE: 1 /HPF
SP GR UR STRIP: 1.01
SPECIFIC GRAVITY URINE: 1.02
UROBILINOGEN URINE: 0.2 MG/DL
WHITE BLOOD CELLS URINE: 0 /HPF

## 2024-04-11 ENCOUNTER — TRANSCRIPTION ENCOUNTER (OUTPATIENT)
Age: 69
End: 2024-04-11

## 2024-05-31 ENCOUNTER — APPOINTMENT (OUTPATIENT)
Dept: PULMONOLOGY | Facility: CLINIC | Age: 69
End: 2024-05-31
Payer: MEDICARE

## 2024-05-31 VITALS
HEIGHT: 66 IN | SYSTOLIC BLOOD PRESSURE: 110 MMHG | OXYGEN SATURATION: 98 % | DIASTOLIC BLOOD PRESSURE: 70 MMHG | BODY MASS INDEX: 27 KG/M2 | HEART RATE: 76 BPM | WEIGHT: 168 LBS

## 2024-05-31 DIAGNOSIS — G47.33 OBSTRUCTIVE SLEEP APNEA (ADULT) (PEDIATRIC): ICD-10-CM

## 2024-05-31 PROCEDURE — 99214 OFFICE O/P EST MOD 30 MIN: CPT

## 2024-05-31 NOTE — HISTORY OF PRESENT ILLNESS
[TextBox_4] : 68-year-old female who I saw 2 years ago.  She knows that she snores but is unclear how loud.  In September 2022 she had a home sleep study which revealed an AHI of 13.1.  She received CPAP but she cannot stand it.  She called a horrible experience.  She now complains of feeling exhausted on awakening in the morning.  She sleeps from 12 midnight to 8 AM and sleeps through the night.  However on awakening she states she feels like she did not sleep.  This symptom has been going on for about a year.  She gets better as the day goes on and pushes through the day.  Her [place] sleepiness score is 3.  There is no witnessed apneas.  She is 5 feet 6 weight 170.  Past medical history hyperlipidemia and Hashimoto's thyroiditis.  Her only medication includes Lexapro.  She would like to know if she is a candidate for an oral appliance.

## 2024-05-31 NOTE — ASSESSMENT
[FreeTextEntry1] : Patient with a feeling of exhaustion but not obviously excessive sleepiness.  I feel this patient should be evaluated for possible sleep apnea.  Will arrange for a home sleep study.  If this does confirm the diagnosis of sleep apnea we will consider an oral appliance.

## 2024-08-06 ENCOUNTER — APPOINTMENT (OUTPATIENT)
Dept: PULMONOLOGY | Facility: CLINIC | Age: 69
End: 2024-08-06

## 2024-08-06 PROCEDURE — 99214 OFFICE O/P EST MOD 30 MIN: CPT

## 2024-08-06 NOTE — HISTORY OF PRESENT ILLNESS
[TextBox_4] : Patient continue to complain of tiredness going on for more than a year.  She sleeps from 12 midnight to 8 AM and sleeps through the night.  She still wakes up feeling tired.  She is not really sleepy during the day with an [place] score of 3 but just feels tired and fatigued.  She had a recent sleep study which reveals mild sleep apnea with an AHI of 7.6 and an RDI of 14.4.  In 2002 she used CPAP for a period of 1 night and states she could not stand.  She will not consider using CPAP.  She states her insurance will not pay for an oral appliance.  She denies anxiety or depression.  She is currently on Lexapro 10 mg daily.  She was recently told of a slightly low hemoglobin and hematocrit level.  She comes in now asking me if her sleep problem accounts for her tiredness.

## 2024-08-06 NOTE — ASSESSMENT
[FreeTextEntry1] : Patient with mild sleep apnea.  I doubt patient's complaint of tiredness and fatigue is due to her sleep apnea.  However the only way to tell would be to treat the sleep apnea which she states she cannot do because she will not tolerate CPAP and she cannot afford an oral appliance.  I have recommended that she follow-up with her primary care regarding her blood values and continue treatment for possible chronic fatigue.

## 2024-11-07 DIAGNOSIS — Z29.9 ENCOUNTER FOR PROPHYLACTIC MEASURES, UNSPECIFIED: ICD-10-CM

## 2025-01-06 ENCOUNTER — APPOINTMENT (OUTPATIENT)
Dept: NEUROLOGY | Facility: CLINIC | Age: 70
End: 2025-01-06

## 2025-02-19 ENCOUNTER — RESULT REVIEW (OUTPATIENT)
Age: 70
End: 2025-02-19

## 2025-03-04 ENCOUNTER — TRANSCRIPTION ENCOUNTER (OUTPATIENT)
Age: 70
End: 2025-03-04

## 2025-03-07 ENCOUNTER — TRANSCRIPTION ENCOUNTER (OUTPATIENT)
Age: 70
End: 2025-03-07

## 2025-03-12 ENCOUNTER — TRANSCRIPTION ENCOUNTER (OUTPATIENT)
Age: 70
End: 2025-03-12

## 2025-03-13 ENCOUNTER — TRANSCRIPTION ENCOUNTER (OUTPATIENT)
Age: 70
End: 2025-03-13

## 2025-04-01 ENCOUNTER — APPOINTMENT (OUTPATIENT)
Dept: OBGYN | Facility: CLINIC | Age: 70
End: 2025-04-01
Payer: MEDICARE

## 2025-04-01 ENCOUNTER — LABORATORY RESULT (OUTPATIENT)
Age: 70
End: 2025-04-01

## 2025-04-01 VITALS
WEIGHT: 171 LBS | BODY MASS INDEX: 27.48 KG/M2 | SYSTOLIC BLOOD PRESSURE: 114 MMHG | DIASTOLIC BLOOD PRESSURE: 70 MMHG | HEIGHT: 66 IN

## 2025-04-01 DIAGNOSIS — N70.11 CHRONIC SALPINGITIS: ICD-10-CM

## 2025-04-01 DIAGNOSIS — R53.82 CHRONIC FATIGUE, UNSPECIFIED: ICD-10-CM

## 2025-04-01 PROCEDURE — G2211 COMPLEX E/M VISIT ADD ON: CPT

## 2025-04-01 PROCEDURE — 99214 OFFICE O/P EST MOD 30 MIN: CPT

## 2025-04-03 PROBLEM — R53.82 CHRONIC FATIGUE: Status: ACTIVE | Noted: 2025-04-01

## 2025-04-08 LAB
25(OH)D3 SERPL-MCNC: 29.9 NG/ML
ALBUMIN SERPL ELPH-MCNC: 4.4 G/DL
ALP BLD-CCNC: 84 U/L
ALT SERPL-CCNC: 13 U/L
ANION GAP SERPL CALC-SCNC: 13 MMOL/L
AST SERPL-CCNC: 12 U/L
BASOPHILS # BLD AUTO: 0.05 K/UL
BASOPHILS NFR BLD AUTO: 1 %
BILIRUB SERPL-MCNC: 0.4 MG/DL
BUN SERPL-MCNC: 19 MG/DL
CALCIUM SERPL-MCNC: 9.2 MG/DL
CHLORIDE SERPL-SCNC: 106 MMOL/L
CO2 SERPL-SCNC: 22 MMOL/L
CREAT SERPL-MCNC: 0.68 MG/DL
EGFRCR SERPLBLD CKD-EPI 2021: 95 ML/MIN/1.73M2
EOSINOPHIL # BLD AUTO: 0.08 K/UL
EOSINOPHIL NFR BLD AUTO: 1.6 %
FERRITIN SERPL-MCNC: 150 NG/ML
GLUCOSE SERPL-MCNC: 95 MG/DL
HCT VFR BLD CALC: 36.3 %
HGB BLD-MCNC: 11.7 G/DL
IMM GRANULOCYTES NFR BLD AUTO: 0.2 %
IRON SATN MFR SERPL: 41 %
IRON SERPL-MCNC: 124 UG/DL
LYMPHOCYTES # BLD AUTO: 2.09 K/UL
LYMPHOCYTES NFR BLD AUTO: 42.6 %
MAN DIFF?: NORMAL
MCHC RBC-ENTMCNC: 30.4 PG
MCHC RBC-ENTMCNC: 32.2 G/DL
MCV RBC AUTO: 94.3 FL
MONOCYTES # BLD AUTO: 0.39 K/UL
MONOCYTES NFR BLD AUTO: 7.9 %
NEUTROPHILS # BLD AUTO: 2.29 K/UL
NEUTROPHILS NFR BLD AUTO: 46.7 %
PLATELET # BLD AUTO: 198 K/UL
POTASSIUM SERPL-SCNC: 4.2 MMOL/L
PROT SERPL-MCNC: 7.1 G/DL
RBC # BLD: 3.85 M/UL
RBC # FLD: 13.6 %
SODIUM SERPL-SCNC: 141 MMOL/L
T4 FREE SERPL-MCNC: 1.1 NG/DL
TIBC SERPL-MCNC: 305 UG/DL
TSH SERPL-ACNC: 3.45 UIU/ML
UIBC SERPL-MCNC: 180 UG/DL
VIT B12 SERPL-MCNC: 943 PG/ML
WBC # FLD AUTO: 4.91 K/UL